# Patient Record
Sex: FEMALE | Race: WHITE | URBAN - METROPOLITAN AREA
[De-identification: names, ages, dates, MRNs, and addresses within clinical notes are randomized per-mention and may not be internally consistent; named-entity substitution may affect disease eponyms.]

---

## 2021-06-24 ENCOUNTER — IMPORTED ENCOUNTER (OUTPATIENT)
Dept: URBAN - METROPOLITAN AREA CLINIC 1 | Facility: CLINIC | Age: 61
End: 2021-06-24

## 2021-06-24 PROBLEM — H35.373: Noted: 2021-06-24

## 2021-06-24 PROBLEM — H43.813: Noted: 2021-06-24

## 2021-06-24 PROBLEM — H25.813: Noted: 2021-06-24

## 2021-06-24 PROBLEM — H43.811: Noted: 2021-06-24

## 2021-06-24 PROBLEM — H04.123: Noted: 2021-06-24

## 2021-06-24 PROBLEM — H17.821: Noted: 2021-06-24

## 2021-06-24 PROBLEM — H40.023: Noted: 2021-06-24

## 2021-06-24 PROCEDURE — 92133 CPTRZD OPH DX IMG PST SGM ON: CPT

## 2021-06-24 PROCEDURE — 99204 OFFICE O/P NEW MOD 45 MIN: CPT

## 2021-06-24 NOTE — PATIENT DISCUSSION
1.  Cataract OU -- Visually significant secondary to glare however will hold on phaco until evaluated by [facility] for consideration of ERM Peel. Observe for now without intervention. The patient was advised to contact us if any change or worsening of vision2. Epiretinal Membrane OU OD>OS -- Condition discussed. Likely causing pt decrease va. Will refer patient to [facility] for evaluation/consideration of ERM Peel OU. 3.  Glaucoma Suspect OU -- (CD 0.75 OU) IOP 15 OU. () Family Hx (Dad and Paternal Aunt). OCT today showing Minimal thinning OU. Patient is considered high risk. Condition was discussed with patient and patient understands. Will continue to monitor patient for any progression in condition. Patient was advised to call us with any problems questions or concerns. 4.  Dry Eyes OU -- Recommend ATs BID-TID OU. 5.  PVD OU -- RD precautions. 6.  Peripheral Corneal Scar OD -- Observe. Return for an appointment in 4 months for a 10/DFE/glare with Dr. Rory Scott.

## 2021-12-16 ENCOUNTER — IMPORTED ENCOUNTER (OUTPATIENT)
Dept: URBAN - METROPOLITAN AREA CLINIC 1 | Facility: CLINIC | Age: 61
End: 2021-12-16

## 2021-12-16 PROBLEM — H25.812: Noted: 2021-12-16

## 2021-12-16 PROBLEM — H25.813: Noted: 2021-12-16

## 2021-12-16 PROCEDURE — 99213 OFFICE O/P EST LOW 20 MIN: CPT

## 2021-12-16 PROCEDURE — 92015 DETERMINE REFRACTIVE STATE: CPT

## 2021-12-16 NOTE — PATIENT DISCUSSION
1.  Cataract OU -- Visually significant secondary to glare however will hold on phaco at this time. Observe for now without intervention. 2.  Dry Eyes OU -- Recommend ATs BID-TID OU. 3.  Glaucoma Suspect OU -- (CD 0.75 OU) IOP 15 OU. () Family Hx (Dad and Paternal Aunt). Patient is considered high risk. 4.  Peripheral Corneal Scar OD -- Observe. 5.  PVD OU -- Stable. RD precautions. 6.  S/p ERM Peel OD (Dr. Hill Nunez) Return for an appointment in 6 months 30/Glare with Dr. Lashaun Donato.

## 2022-03-25 ENCOUNTER — EMERGENCY VISIT (OUTPATIENT)
Dept: URBAN - METROPOLITAN AREA CLINIC 1 | Facility: CLINIC | Age: 62
End: 2022-03-25

## 2022-03-25 DIAGNOSIS — H25.813: ICD-10-CM

## 2022-03-25 DIAGNOSIS — H35.372: ICD-10-CM

## 2022-03-25 DIAGNOSIS — H40.023: ICD-10-CM

## 2022-03-25 DIAGNOSIS — H04.123: ICD-10-CM

## 2022-03-25 DIAGNOSIS — H17.9: ICD-10-CM

## 2022-03-25 PROCEDURE — 92012 INTRM OPH EXAM EST PATIENT: CPT

## 2022-03-25 PROCEDURE — 92015 DETERMINE REFRACTIVE STATE: CPT

## 2022-03-25 ASSESSMENT — VISUAL ACUITY
OS_BAT: 20/100
OD_CC: 20/400
OS_CC: 20/30
OD_BAT: 20/100

## 2022-03-25 ASSESSMENT — TONOMETRY
OD_IOP_MMHG: 18
OS_IOP_MMHG: 17

## 2022-03-25 NOTE — PATIENT DISCUSSION
Visually Significant (secondary to glare), discussed the risks, benefits, alternatives, and limitations of cataract surgery. The patient stated a full understanding and a desire to proceed with the procedure. The patient will need to return for pre-op appointment with cataract measurements and to have any additional questions answered, and start pre-operative eye drops as directed. OS then OD. Planning monofocal distance, probable Toric. No MF candidate due to retinal pathology.

## 2022-04-08 ASSESSMENT — VISUAL ACUITY
OS_CC: 20/40
OS_SC: 20/30
OD_GLARE: 20/400
OD_GLARE: 20/400
OS_SC: 20/30
OS_GLARE: 20/400
OD_SC: 20/60
OD_CC: 20/40
OS_GLARE: 20/400
OD_SC: 20/30

## 2022-04-08 ASSESSMENT — TONOMETRY
OD_IOP_MMHG: 15
OS_IOP_MMHG: 15
OS_IOP_MMHG: 21
OD_IOP_MMHG: 21

## 2022-05-12 ENCOUNTER — PRE-OP/H&P (OUTPATIENT)
Dept: URBAN - METROPOLITAN AREA CLINIC 1 | Facility: CLINIC | Age: 62
End: 2022-05-12

## 2022-05-12 VITALS
HEIGHT: 61 IN | DIASTOLIC BLOOD PRESSURE: 83 MMHG | BODY MASS INDEX: 24.92 KG/M2 | SYSTOLIC BLOOD PRESSURE: 150 MMHG | HEART RATE: 70 BPM | WEIGHT: 132 LBS

## 2022-05-12 DIAGNOSIS — H25.813: ICD-10-CM

## 2022-05-12 PROCEDURE — 99499 UNLISTED E&M SERVICE: CPT

## 2022-05-12 PROCEDURE — 92136 OPHTHALMIC BIOMETRY: CPT

## 2022-05-12 ASSESSMENT — KERATOMETRY
OD_AXISANGLE_DEGREES: 95
OD_K2POWER_DIOPTERS: 46.00
OD_AXISANGLE2_DEGREES: 005
OD_K1POWER_DIOPTERS: 45.25
OS_AXISANGLE_DEGREES: 180
OS_K1POWER_DIOPTERS: 45.00
OS_AXISANGLE2_DEGREES: 90
OS_K2POWER_DIOPTERS: 45.00

## 2022-05-12 ASSESSMENT — VISUAL ACUITY
OS_CC: 20/30
OD_CC: 20/400
OS_BAT: 20/200
OD_BAT: 20/400

## 2022-05-12 ASSESSMENT — TONOMETRY
OD_IOP_MMHG: 17
OS_IOP_MMHG: 17

## 2022-05-12 NOTE — PATIENT DISCUSSION
Visually Significant secondary to glare, discussed the risks, benefits, alternatives, and limitations of cataract surgery. The patient stated a full understanding and a desire to proceed with the procedure. The patient will need to start pre-operative eye drops as directed. Now planning OD then OS. Proceed with phaco/pcl OD (TORIC IOL). No MF candidate due to retinal pathology.

## 2022-05-19 ENCOUNTER — POST-OP (OUTPATIENT)
Dept: URBAN - METROPOLITAN AREA CLINIC 1 | Facility: CLINIC | Age: 62
End: 2022-05-19

## 2022-05-19 DIAGNOSIS — Z96.1: ICD-10-CM

## 2022-05-19 PROCEDURE — 99024 POSTOP FOLLOW-UP VISIT: CPT

## 2022-05-19 ASSESSMENT — KERATOMETRY
OS_AXISANGLE_DEGREES: 180
OS_K2POWER_DIOPTERS: 45.00
OD_AXISANGLE2_DEGREES: 005
OS_AXISANGLE2_DEGREES: 90
OS_K1POWER_DIOPTERS: 45.00
OD_K1POWER_DIOPTERS: 45.25
OD_K2POWER_DIOPTERS: 46.00
OD_AXISANGLE_DEGREES: 95

## 2022-05-19 ASSESSMENT — TONOMETRY: OD_IOP_MMHG: 19

## 2022-05-19 ASSESSMENT — VISUAL ACUITY: OD_SC: 20/25-2

## 2022-05-19 NOTE — PATIENT DISCUSSION
Use  Prolensa QD OD and Zylet BID OD through completion of PO gtt chart regimen/ Per our instructions given to patient.

## 2022-06-02 ENCOUNTER — POST OP/EVAL OF SECOND EYE (OUTPATIENT)
Dept: URBAN - METROPOLITAN AREA CLINIC 1 | Facility: CLINIC | Age: 62
End: 2022-06-02

## 2022-06-02 VITALS — SYSTOLIC BLOOD PRESSURE: 119 MMHG | HEART RATE: 64 BPM | DIASTOLIC BLOOD PRESSURE: 76 MMHG | HEIGHT: 55 IN

## 2022-06-02 DIAGNOSIS — H25.812: ICD-10-CM

## 2022-06-02 DIAGNOSIS — Z96.1: ICD-10-CM

## 2022-06-02 PROCEDURE — 92136 OPHTHALMIC BIOMETRY: CPT

## 2022-06-02 PROCEDURE — 99024 POSTOP FOLLOW-UP VISIT: CPT

## 2022-06-02 ASSESSMENT — KERATOMETRY
OS_K2POWER_DIOPTERS: 45.00
OD_K2POWER_DIOPTERS: 46.00
OD_K1POWER_DIOPTERS: 45.25
OD_AXISANGLE_DEGREES: 95
OD_AXISANGLE2_DEGREES: 005
OS_AXISANGLE_DEGREES: 180
OS_K1POWER_DIOPTERS: 45.00
OS_AXISANGLE2_DEGREES: 90

## 2022-06-02 ASSESSMENT — VISUAL ACUITY: OD_SC: 20/30-1

## 2022-06-02 NOTE — PATIENT DISCUSSION
Visually Significant (Secondary to glare), discussed the risks, benefits, alternatives, and limitations of cataract surgery. The patient stated a full understanding and a desire to proceed with the procedure. Discussed with patient if post-op drops are more than $120 for all three combined when filling at their Pharmacy, please call our office to request generic substitutions. Phaco PCL OS *Guarded visual prognosis secondary ERM OS w/ Pseudohole.

## 2022-06-15 ENCOUNTER — SURGERY/PROCEDURE (OUTPATIENT)
Dept: URBAN - METROPOLITAN AREA SURGERY 2 | Facility: SURGERY | Age: 62
End: 2022-06-15

## 2022-06-15 DIAGNOSIS — H25.812: ICD-10-CM

## 2022-06-15 PROBLEM — Z96.1: Noted: 2022-06-15

## 2022-06-15 PROCEDURE — 66984 XCAPSL CTRC RMVL W/O ECP: CPT

## 2022-06-16 ENCOUNTER — POST-OP (OUTPATIENT)
Dept: URBAN - METROPOLITAN AREA CLINIC 1 | Facility: CLINIC | Age: 62
End: 2022-06-16

## 2022-06-16 DIAGNOSIS — Z96.1: ICD-10-CM

## 2022-06-16 PROCEDURE — 99024 POSTOP FOLLOW-UP VISIT: CPT

## 2022-06-16 ASSESSMENT — KERATOMETRY
OS_K1POWER_DIOPTERS: 45.00
OD_AXISANGLE_DEGREES: 95
OD_K1POWER_DIOPTERS: 45.25
OS_AXISANGLE2_DEGREES: 90
OD_AXISANGLE_DEGREES: 95
OD_K2POWER_DIOPTERS: 46.00
OD_AXISANGLE2_DEGREES: 005
OS_AXISANGLE_DEGREES: 180
OS_AXISANGLE2_DEGREES: 90
OS_K2POWER_DIOPTERS: 45.00
OD_K2POWER_DIOPTERS: 46.00
OD_K1POWER_DIOPTERS: 45.25
OS_K1POWER_DIOPTERS: 45.00
OD_AXISANGLE2_DEGREES: 005
OS_K2POWER_DIOPTERS: 45.00
OS_AXISANGLE_DEGREES: 180

## 2022-06-16 ASSESSMENT — TONOMETRY
OS_IOP_MMHG: 23
OD_IOP_MMHG: 16

## 2022-06-16 ASSESSMENT — VISUAL ACUITY
OS_SC: 20/20
OD_SC: 20/20-2

## 2022-06-16 NOTE — PATIENT DISCUSSION
2 week PO: Post-Op findings as expected. Continue drop regimen as directed. *H/o ERM peel OD and K scar OD.

## 2022-06-23 ENCOUNTER — EMERGENCY VISIT (OUTPATIENT)
Dept: URBAN - METROPOLITAN AREA CLINIC 1 | Facility: CLINIC | Age: 62
End: 2022-06-23

## 2022-06-23 DIAGNOSIS — H43.813: ICD-10-CM

## 2022-06-23 PROCEDURE — 99213 OFFICE O/P EST LOW 20 MIN: CPT

## 2022-06-23 ASSESSMENT — KERATOMETRY
OS_AXISANGLE2_DEGREES: 90
OS_AXISANGLE_DEGREES: 180
OS_K1POWER_DIOPTERS: 45.00
OS_K2POWER_DIOPTERS: 45.00
OD_K2POWER_DIOPTERS: 46.00
OD_AXISANGLE2_DEGREES: 005
OD_AXISANGLE_DEGREES: 95
OD_K1POWER_DIOPTERS: 45.25

## 2022-06-23 ASSESSMENT — VISUAL ACUITY
OD_SC: 20/30 +2
OS_SC: 20/25

## 2022-06-23 ASSESSMENT — TONOMETRY
OS_IOP_MMHG: 16
OD_IOP_MMHG: 16

## 2022-07-14 ENCOUNTER — POST-OP (OUTPATIENT)
Dept: URBAN - METROPOLITAN AREA CLINIC 1 | Facility: CLINIC | Age: 62
End: 2022-07-14

## 2022-07-14 DIAGNOSIS — Z96.1: ICD-10-CM

## 2022-07-14 PROCEDURE — 99024 POSTOP FOLLOW-UP VISIT: CPT

## 2022-07-14 ASSESSMENT — KERATOMETRY
OS_AXISANGLE_DEGREES: 180
OD_AXISANGLE_DEGREES: 95
OD_AXISANGLE2_DEGREES: 005
OD_K1POWER_DIOPTERS: 45.25
OS_K1POWER_DIOPTERS: 45.00
OS_K2POWER_DIOPTERS: 45.00
OS_AXISANGLE2_DEGREES: 90
OD_K2POWER_DIOPTERS: 46.00

## 2022-07-14 ASSESSMENT — TONOMETRY
OS_IOP_MMHG: 14
OD_IOP_MMHG: 14

## 2022-07-14 ASSESSMENT — VISUAL ACUITY: OD_SC: 20/25+2

## 2023-04-10 NOTE — PATIENT DISCUSSION
Toric OD. 1 week PO: Post-Op findings as expected. Continue drop regimen as directed. *H/o ERM peel OD and K scar OD. show

## 2023-10-06 ENCOUNTER — COMPREHENSIVE EXAM (OUTPATIENT)
Dept: URBAN - METROPOLITAN AREA CLINIC 1 | Facility: CLINIC | Age: 63
End: 2023-10-06

## 2023-10-06 DIAGNOSIS — H17.9: ICD-10-CM

## 2023-10-06 DIAGNOSIS — H40.023: ICD-10-CM

## 2023-10-06 DIAGNOSIS — H16.143: ICD-10-CM

## 2023-10-06 DIAGNOSIS — Z96.1: ICD-10-CM

## 2023-10-06 DIAGNOSIS — H04.123: ICD-10-CM

## 2023-10-06 DIAGNOSIS — H35.372: ICD-10-CM

## 2023-10-06 DIAGNOSIS — H43.813: ICD-10-CM

## 2023-10-06 PROCEDURE — 92014 COMPRE OPH EXAM EST PT 1/>: CPT

## 2023-10-06 PROCEDURE — 92133 CPTRZD OPH DX IMG PST SGM ON: CPT

## 2023-10-06 ASSESSMENT — VISUAL ACUITY
OD_SC: 20/25-1
OD_BAT: 20/30
OS_SC: 20/30+1
OD_CC: J1
OS_BAT: 20/30
OD_CC: 20/20-2
OS_CC: 20/25-1
OS_CC: J1+

## 2023-10-06 ASSESSMENT — TONOMETRY
OD_IOP_MMHG: 17
OS_IOP_MMHG: 16

## 2023-10-06 ASSESSMENT — KERATOMETRY
OS_K2POWER_DIOPTERS: 45.00
OD_AXISANGLE2_DEGREES: 005
OD_AXISANGLE_DEGREES: 95
OS_AXISANGLE2_DEGREES: 90
OD_K2POWER_DIOPTERS: 46.00
OS_K1POWER_DIOPTERS: 45.00
OD_K1POWER_DIOPTERS: 45.25
OS_AXISANGLE_DEGREES: 180

## 2024-07-03 ENCOUNTER — COMPREHENSIVE EXAM (OUTPATIENT)
Dept: URBAN - METROPOLITAN AREA CLINIC 1 | Facility: CLINIC | Age: 64
End: 2024-07-03

## 2024-07-03 DIAGNOSIS — Z96.1: ICD-10-CM

## 2024-07-03 DIAGNOSIS — H40.023: ICD-10-CM

## 2024-07-03 DIAGNOSIS — H04.123: ICD-10-CM

## 2024-07-03 DIAGNOSIS — H16.143: ICD-10-CM

## 2024-07-03 DIAGNOSIS — H35.372: ICD-10-CM

## 2024-07-03 PROCEDURE — 92014 COMPRE OPH EXAM EST PT 1/>: CPT

## 2024-07-03 PROCEDURE — 92134 CPTRZ OPH DX IMG PST SGM RTA: CPT

## 2024-07-03 PROCEDURE — 92015 DETERMINE REFRACTIVE STATE: CPT

## 2024-07-03 ASSESSMENT — KERATOMETRY
OD_K2POWER_DIOPTERS: 46.00
OS_AXISANGLE2_DEGREES: 90
OS_K2POWER_DIOPTERS: 45.00
OS_AXISANGLE_DEGREES: 180
OD_AXISANGLE2_DEGREES: 005
OD_K1POWER_DIOPTERS: 45.25
OD_AXISANGLE_DEGREES: 95
OS_K1POWER_DIOPTERS: 45.00

## 2024-07-03 ASSESSMENT — VISUAL ACUITY
OS_CC: 20/20
OD_CC: J1+
OD_CC: 20/20-1
OS_CC: J1+

## 2024-07-03 ASSESSMENT — TONOMETRY
OS_IOP_MMHG: 16
OD_IOP_MMHG: 16

## 2025-01-06 ENCOUNTER — FOLLOW UP (OUTPATIENT)
Age: 65
End: 2025-01-06

## 2025-01-06 DIAGNOSIS — H35.372: ICD-10-CM

## 2025-01-06 DIAGNOSIS — H40.023: ICD-10-CM

## 2025-01-06 PROCEDURE — 92133 CPTRZD OPH DX IMG PST SGM ON: CPT

## 2025-01-06 PROCEDURE — 99213 OFFICE O/P EST LOW 20 MIN: CPT

## 2025-07-17 ENCOUNTER — COMPREHENSIVE EXAM (OUTPATIENT)
Age: 65
End: 2025-07-17

## 2025-07-17 DIAGNOSIS — H40.023: ICD-10-CM

## 2025-07-17 DIAGNOSIS — H16.143: ICD-10-CM

## 2025-07-17 DIAGNOSIS — H43.813: ICD-10-CM

## 2025-07-17 DIAGNOSIS — H35.372: ICD-10-CM

## 2025-07-17 DIAGNOSIS — H17.11: ICD-10-CM

## 2025-07-17 DIAGNOSIS — H04.123: ICD-10-CM

## 2025-07-17 DIAGNOSIS — Z96.1: ICD-10-CM

## 2025-07-17 PROCEDURE — 99214 OFFICE O/P EST MOD 30 MIN: CPT

## 2025-07-17 PROCEDURE — 92015 DETERMINE REFRACTIVE STATE: CPT

## 2025-07-17 PROCEDURE — 92134 CPTRZ OPH DX IMG PST SGM RTA: CPT
